# Patient Record
Sex: FEMALE | Race: OTHER | HISPANIC OR LATINO | URBAN - METROPOLITAN AREA
[De-identification: names, ages, dates, MRNs, and addresses within clinical notes are randomized per-mention and may not be internally consistent; named-entity substitution may affect disease eponyms.]

---

## 2021-06-12 ENCOUNTER — EMERGENCY (EMERGENCY)
Facility: HOSPITAL | Age: 21
LOS: 1 days | Discharge: HOME | End: 2021-06-12
Attending: EMERGENCY MEDICINE
Payer: MEDICAID

## 2021-06-12 VITALS
OXYGEN SATURATION: 99 % | TEMPERATURE: 99 F | HEART RATE: 89 BPM | HEIGHT: 65 IN | SYSTOLIC BLOOD PRESSURE: 137 MMHG | WEIGHT: 171.96 LBS | DIASTOLIC BLOOD PRESSURE: 84 MMHG | RESPIRATION RATE: 18 BRPM

## 2021-06-12 PROCEDURE — 99283 EMERGENCY DEPT VISIT LOW MDM: CPT

## 2021-06-13 VITALS
DIASTOLIC BLOOD PRESSURE: 78 MMHG | TEMPERATURE: 99 F | SYSTOLIC BLOOD PRESSURE: 131 MMHG | OXYGEN SATURATION: 99 % | HEART RATE: 82 BPM | RESPIRATION RATE: 16 BRPM

## 2021-06-13 PROCEDURE — 99283 EMERGENCY DEPT VISIT LOW MDM: CPT

## 2021-06-13 RX ORDER — IBUPROFEN 200 MG
400 TABLET ORAL ONCE
Refills: 0 | Status: COMPLETED | OUTPATIENT
Start: 2021-06-13 | End: 2021-06-13

## 2021-06-13 RX ORDER — ACETAMINOPHEN 500 MG
975 TABLET ORAL ONCE
Refills: 0 | Status: COMPLETED | OUTPATIENT
Start: 2021-06-13 | End: 2021-06-13

## 2021-06-13 RX ADMIN — Medication 300 MILLIGRAM(S): at 04:59

## 2021-06-13 RX ADMIN — Medication 400 MILLIGRAM(S): at 02:29

## 2021-06-13 RX ADMIN — Medication 975 MILLIGRAM(S): at 02:29

## 2021-06-13 NOTE — ED PROVIDER NOTE - NSFOLLOWUPINSTRUCTIONS_ED_ALL_ED_FT
You were seen and evaluated in the emergency room for dental pain.    Please follow up with your oral surgeon in the next few days. You can call the surgeon your dentist recommended or you can call the number attached.    Take the antibiotics 3 times a day.    Take 650mg tylenol every 6 hours as needed for pain.  Take 400-600 mg ibuprofen every 6-8 hours as needed for pain, make sure to take with food.  You can also use ice to the area for 10 minutes every 2 hours for the first 2 days after injury.    Seek immediate medical attention for any worsening, new, or concerning symptoms.    Please read all attached.

## 2021-06-13 NOTE — ED ADULT NURSE NOTE - OBJECTIVE STATEMENT
Pt is a AAox3, 21y female c/o right lower mouth and teeth pain x 1 day. Pt states she saw a dentist op and was prescribed atb and codeine. Pt states no pain relief with codeine and came to ED for further evaluation. No s/s drooling or tongue swelling. Denies cp, sob, palpitations or fevers.

## 2021-06-13 NOTE — ED PROVIDER NOTE - PROGRESS NOTE DETAILS
Nini Raman, resident MD: pt has significant improvement of pain after medications. will switch abx to clindamycin as pt has periorbital swelling since taking amoxicillin and concern for possible allergic reaction. will discharge patient home at this time. discussed return precautions and need for outpatient follow up.

## 2021-06-13 NOTE — ED PROVIDER NOTE - ATTENDING CONTRIBUTION TO CARE
attending Melyssa: 21yF no PMH p/w dental pain to R lower tooth since yesterday, seen by dentist started on amoxicillin and codeine for pain. Facial swelling (diffuse), no palpable gingival abscess on exam. Will provide additional pain control, change abx (possible reaction to amoxicillin?), reassess

## 2021-06-13 NOTE — ED PROVIDER NOTE - PATIENT PORTAL LINK FT
You can access the FollowMyHealth Patient Portal offered by [facility] by registering at the following website: [email or web address]. By joining [employer]’s FollowMyHealth portal, you will also be able to view your health information using other applications (apps) compatible with our system.

## 2021-06-13 NOTE — ED PROVIDER NOTE - CLINICAL SUMMARY MEDICAL DECISION MAKING FREE TEXT BOX
20yo woman presents with dental pain with no relief from outpatient medications and no evidence of abscess but was recommended extraction by dentist today and given a course of amoxicillin. No evidence of airway compromise or deep space infection. Will give pain medication and reassess and will likely require outpt f/u with oral surgeon.

## 2021-06-13 NOTE — ED PROVIDER NOTE - PHYSICAL EXAMINATION
General: well-appearing young woman in no acute distress  Head: normocephalic, atraumatic  Eyes: PERRL, no conjunctival injection  Mouth: moist mucous membranes, no oropharyngeal erythema, tender to palpation at tooth #28-29, no fluctuance at gumline, soft floor of mouth, midline uvula  Neck: supple neck, full ROM  CV: normal rate and rhythm, peripheral pulses 2+ bilateral UE  Respiratory: clear to auscultation bilaterally, no stridor  MSK: no joint deformities  Neuro: alert and oriented x3, speech clear, moving all extremities  Skin: no erythema on jaw or neck General: well-appearing young woman in no acute distress  Head: normocephalic, atraumatic  Eyes: PERRL, no conjunctival injection, periorbital swelling bilaterally  Mouth: moist mucous membranes, no oropharyngeal erythema, tender to palpation at tooth #28-29, no fluctuance at gumline, soft floor of mouth, midline uvula  Neck: supple neck, full ROM  CV: normal rate and rhythm, peripheral pulses 2+ bilateral UE  Respiratory: clear to auscultation bilaterally, no stridor  MSK: no joint deformities  Neuro: alert and oriented x3, speech clear, moving all extremities  Skin: no erythema on jaw or neck

## 2021-06-13 NOTE — ED PROVIDER NOTE - NS ED ROS FT
General: no fever  Head: no headache  Eyes: no vision change  ENT: +right lower jaw pain  CV: no chest pain  Resp: no SOB, no cough  GI: no N/V/D, no abdominal pain  : no dysuria  MSK: no joint pain  Skin: no new rash  Neuro: no focal weakness, no change in sensation

## 2021-06-13 NOTE — ED PROVIDER NOTE - OBJECTIVE STATEMENT
20yo woman no PMH presents with pain in right lower teeth with swelling since yesterday and was seen by a dentist today who started her on amoxicillin and give codeine for pain. She was also told to call an oral surgeon for an extraction but was not able to get in touch as they were closed. She has taken 3 tabs of codeine with persistent pain without relief and came to the ED for evaluation.  She is able to tolerate secretions and no SOB. No fevers. She has not tried any other pain medications.

## 2022-04-30 ENCOUNTER — EMERGENCY (EMERGENCY)
Facility: HOSPITAL | Age: 22
LOS: 1 days | Discharge: HOME | End: 2022-04-30
Attending: EMERGENCY MEDICINE | Admitting: EMERGENCY MEDICINE
Payer: MEDICAID

## 2022-04-30 VITALS
OXYGEN SATURATION: 98 % | TEMPERATURE: 98 F | HEART RATE: 94 BPM | SYSTOLIC BLOOD PRESSURE: 111 MMHG | RESPIRATION RATE: 16 BRPM | DIASTOLIC BLOOD PRESSURE: 64 MMHG

## 2022-04-30 VITALS
DIASTOLIC BLOOD PRESSURE: 78 MMHG | TEMPERATURE: 98 F | HEIGHT: 65 IN | SYSTOLIC BLOOD PRESSURE: 124 MMHG | OXYGEN SATURATION: 100 % | RESPIRATION RATE: 16 BRPM | HEART RATE: 85 BPM

## 2022-04-30 LAB
ALBUMIN SERPL ELPH-MCNC: 4.4 G/DL — SIGNIFICANT CHANGE UP (ref 3.3–5)
ALP SERPL-CCNC: 57 U/L — SIGNIFICANT CHANGE UP (ref 40–120)
ALT FLD-CCNC: 17 U/L — SIGNIFICANT CHANGE UP (ref 4–33)
ANION GAP SERPL CALC-SCNC: 13 MMOL/L — SIGNIFICANT CHANGE UP (ref 7–14)
AST SERPL-CCNC: 18 U/L — SIGNIFICANT CHANGE UP (ref 4–32)
BASOPHILS # BLD AUTO: 0.02 K/UL — SIGNIFICANT CHANGE UP (ref 0–0.2)
BASOPHILS NFR BLD AUTO: 0.4 % — SIGNIFICANT CHANGE UP (ref 0–2)
BILIRUB SERPL-MCNC: <0.2 MG/DL — SIGNIFICANT CHANGE UP (ref 0.2–1.2)
BUN SERPL-MCNC: 9 MG/DL — SIGNIFICANT CHANGE UP (ref 7–23)
CALCIUM SERPL-MCNC: 8.6 MG/DL — SIGNIFICANT CHANGE UP (ref 8.4–10.5)
CHLORIDE SERPL-SCNC: 97 MMOL/L — LOW (ref 98–107)
CO2 SERPL-SCNC: 23 MMOL/L — SIGNIFICANT CHANGE UP (ref 22–31)
CREAT SERPL-MCNC: 1.02 MG/DL — SIGNIFICANT CHANGE UP (ref 0.5–1.3)
EGFR: 80 ML/MIN/1.73M2 — SIGNIFICANT CHANGE UP
EOSINOPHIL # BLD AUTO: 0 K/UL — SIGNIFICANT CHANGE UP (ref 0–0.5)
EOSINOPHIL NFR BLD AUTO: 0 % — SIGNIFICANT CHANGE UP (ref 0–6)
FLUAV AG NPH QL: DETECTED
FLUBV AG NPH QL: SIGNIFICANT CHANGE UP
GLUCOSE SERPL-MCNC: 113 MG/DL — HIGH (ref 70–99)
HCT VFR BLD CALC: 36.3 % — SIGNIFICANT CHANGE UP (ref 34.5–45)
HGB BLD-MCNC: 12.2 G/DL — SIGNIFICANT CHANGE UP (ref 11.5–15.5)
IANC: 4.47 K/UL — SIGNIFICANT CHANGE UP (ref 1.8–7.4)
IMM GRANULOCYTES NFR BLD AUTO: 0.2 % — SIGNIFICANT CHANGE UP (ref 0–1.5)
LYMPHOCYTES # BLD AUTO: 0.44 K/UL — LOW (ref 1–3.3)
LYMPHOCYTES # BLD AUTO: 7.9 % — LOW (ref 13–44)
MCHC RBC-ENTMCNC: 30.5 PG — SIGNIFICANT CHANGE UP (ref 27–34)
MCHC RBC-ENTMCNC: 33.6 GM/DL — SIGNIFICANT CHANGE UP (ref 32–36)
MCV RBC AUTO: 90.8 FL — SIGNIFICANT CHANGE UP (ref 80–100)
MONOCYTES # BLD AUTO: 0.64 K/UL — SIGNIFICANT CHANGE UP (ref 0–0.9)
MONOCYTES NFR BLD AUTO: 11.5 % — SIGNIFICANT CHANGE UP (ref 2–14)
NEUTROPHILS # BLD AUTO: 4.47 K/UL — SIGNIFICANT CHANGE UP (ref 1.8–7.4)
NEUTROPHILS NFR BLD AUTO: 80 % — HIGH (ref 43–77)
NRBC # BLD: 0 /100 WBCS — SIGNIFICANT CHANGE UP
NRBC # FLD: 0 K/UL — SIGNIFICANT CHANGE UP
PLATELET # BLD AUTO: 180 K/UL — SIGNIFICANT CHANGE UP (ref 150–400)
POTASSIUM SERPL-MCNC: 3.9 MMOL/L — SIGNIFICANT CHANGE UP (ref 3.5–5.3)
POTASSIUM SERPL-SCNC: 3.9 MMOL/L — SIGNIFICANT CHANGE UP (ref 3.5–5.3)
PROT SERPL-MCNC: 7.8 G/DL — SIGNIFICANT CHANGE UP (ref 6–8.3)
RBC # BLD: 4 M/UL — SIGNIFICANT CHANGE UP (ref 3.8–5.2)
RBC # FLD: 13.3 % — SIGNIFICANT CHANGE UP (ref 10.3–14.5)
RSV RNA NPH QL NAA+NON-PROBE: SIGNIFICANT CHANGE UP
SARS-COV-2 RNA SPEC QL NAA+PROBE: SIGNIFICANT CHANGE UP
SODIUM SERPL-SCNC: 133 MMOL/L — LOW (ref 135–145)
WBC # BLD: 5.58 K/UL — SIGNIFICANT CHANGE UP (ref 3.8–10.5)
WBC # FLD AUTO: 5.58 K/UL — SIGNIFICANT CHANGE UP (ref 3.8–10.5)

## 2022-04-30 PROCEDURE — 93010 ELECTROCARDIOGRAM REPORT: CPT

## 2022-04-30 PROCEDURE — 99284 EMERGENCY DEPT VISIT MOD MDM: CPT | Mod: 25

## 2022-04-30 PROCEDURE — 71046 X-RAY EXAM CHEST 2 VIEWS: CPT | Mod: 26

## 2022-04-30 RX ORDER — ACETAMINOPHEN 500 MG
650 TABLET ORAL ONCE
Refills: 0 | Status: COMPLETED | OUTPATIENT
Start: 2022-04-30 | End: 2022-04-30

## 2022-04-30 RX ORDER — SODIUM CHLORIDE 9 MG/ML
1000 INJECTION INTRAMUSCULAR; INTRAVENOUS; SUBCUTANEOUS ONCE
Refills: 0 | Status: COMPLETED | OUTPATIENT
Start: 2022-04-30 | End: 2022-04-30

## 2022-04-30 RX ADMIN — SODIUM CHLORIDE 1000 MILLILITER(S): 9 INJECTION INTRAMUSCULAR; INTRAVENOUS; SUBCUTANEOUS at 17:26

## 2022-04-30 RX ADMIN — Medication 650 MILLIGRAM(S): at 17:26

## 2022-04-30 NOTE — ED PROVIDER NOTE - PROGRESS NOTE DETAILS
ANGÉLICA Gordon: Labs reviewed, pt received 1L NS, reports improvement in symptoms. Will d/c home with PMD follow up. Likely viral illness, flu/covid test pending

## 2022-04-30 NOTE — ED PROVIDER NOTE - ATTENDING APP SHARED VISIT CONTRIBUTION OF CARE
Attending Statement: I have reviewed and agree with all pertinent clinical information, including history and physical exam and agree with treatment plan of the PA, except as noted.  21yoF denies any sig pmhx presents three day hx of body ache, myalgia, cough. Dry cough, has some chest pain with cough. no SOB no nausea or vomit. no diarrhea. Dec po intake. no abdominal pain. no sick contact no travel. Vital signs noted. well appearing no distress. non icteric No resp distress. able to speak in full and clear sentences. no wheeze, rales or stridor. soft nontender abdomen. no  rebound. no guarding. no sign of trauma. no CVAT plan lab, cxr, covid, IVF< re asses

## 2022-04-30 NOTE — ED PROVIDER NOTE - PATIENT PORTAL LINK FT
You can access the FollowMyHealth Patient Portal offered by [facility] by registering at the following website: [email or web address]. By joining [employer]’s FollowMyHealth portal, you will also be able to view your health information using other applications (apps) compatible with our system.

## 2022-04-30 NOTE — ED PROVIDER NOTE - CLINICAL SUMMARY MEDICAL DECISION MAKING FREE TEXT BOX
21yF w/no stated pmhx presenting with 3 days of fever, body aches, cough, chest pain, lightheadedness. Pt works as a , has had 2 negative home covid tests. Likely viral syndrome. Pt is well appearing, afebrile, EKG sinus tach 110bpm, lungs clear on exam. Plan: flu/covid test, basic labs, IV fluids, tylenol, CXR.

## 2022-04-30 NOTE — ED PROVIDER NOTE - OBJECTIVE STATEMENT
21yF w/no stated pmhx presenting with 3 days of fever, body aches, cough and chest pain. Pt states 3 days ago her symptoms began with headache and dry cough. Reports intermittent fever, tmax 101, with body aches. Reports sternal chest pain, lasting a few minutes at a time, non exertional, non radiating, no chest pain at present. Pt states she had nausea today and vomited while waiting in the ED. +decreased appetite, sore throat and dry cough. Additionally she reports lightheadedness and generalized weakness. Pt works as a , has had 2 negative home covid tests. Pt denies shortness of breath, palpitations, near syncope, abd pain, diarrhea, room spinning dizziness, urinary complaints, recent travel, known sick contacts or any other concerns. 21yF w/no stated pmhx presenting with 3 days of fever, body aches, cough and chest pain. Pt states 3 days ago her symptoms began with headache and dry cough. Reports intermittent fever, tmax 101, with body aches. Reports intermittent mid sternal chest pain lasting a few minutes at a time, non exertional, non radiating, mostly occurring with cough, no chest pain at present. Pt states she had nausea today and vomited while waiting in the ED. +decreased appetite, sore throat and dry cough. Additionally she reports lightheadedness and generalized weakness. Pt works as a , has had 2 negative home covid tests. Pt denies shortness of breath, palpitations, near syncope, abd pain, diarrhea, room spinning dizziness, urinary complaints, recent travel, known sick contacts or any other concerns.

## 2022-04-30 NOTE — ED PROVIDER NOTE - NS ED ATTENDING STATEMENT MOD
This was a shared visit with the BEBA. I reviewed and verified the documentation and independently performed the documented:

## 2022-04-30 NOTE — ED ADULT TRIAGE NOTE - CHIEF COMPLAINT QUOTE
Pt c/o fever, weakness, n/v, dizziness, chest pain, intermittent since Thursday. Pt denies SOB, afebrile at present.

## 2022-04-30 NOTE — ED PROVIDER NOTE - NSFOLLOWUPINSTRUCTIONS_ED_ALL_ED_FT
Follow up with your primary care doctor within 1 week  Drink plenty of fluids  Take Tylenol 650mg every 6 hours as needed for fever or pain  -You can also take Ibuprofen 600mg every 6-8 hours as needed for fever or pain, take with food  Return to the ER with any worsening or concerning symptoms, weakness, shortness of breath, chest pain, feeling faint or any other concerns.

## 2022-04-30 NOTE — ED ADULT NURSE NOTE - OBJECTIVE STATEMENT
Pt received c/o fever and muscle aches since Thursday. Pt also c/o generalized weakness and decreased PO intake due to no appetite.

## 2023-06-28 ENCOUNTER — OUTPATIENT (OUTPATIENT)
Dept: OUTPATIENT SERVICES | Facility: HOSPITAL | Age: 23
LOS: 1 days | End: 2023-06-28
Payer: MEDICAID

## 2023-06-28 VITALS
SYSTOLIC BLOOD PRESSURE: 130 MMHG | RESPIRATION RATE: 16 BRPM | OXYGEN SATURATION: 100 % | WEIGHT: 154.98 LBS | DIASTOLIC BLOOD PRESSURE: 69 MMHG | HEIGHT: 66 IN | HEART RATE: 87 BPM | TEMPERATURE: 98 F

## 2023-06-28 DIAGNOSIS — N84.0 POLYP OF CORPUS UTERI: ICD-10-CM

## 2023-06-28 DIAGNOSIS — R01.1 CARDIAC MURMUR, UNSPECIFIED: ICD-10-CM

## 2023-06-28 DIAGNOSIS — H40.9 UNSPECIFIED GLAUCOMA: ICD-10-CM

## 2023-06-28 DIAGNOSIS — Z01.818 ENCOUNTER FOR OTHER PREPROCEDURAL EXAMINATION: ICD-10-CM

## 2023-06-28 LAB — BLD GP AB SCN SERPL QL: SIGNIFICANT CHANGE UP

## 2023-06-28 RX ORDER — SODIUM CHLORIDE 9 MG/ML
3 INJECTION INTRAMUSCULAR; INTRAVENOUS; SUBCUTANEOUS EVERY 8 HOURS
Refills: 0 | Status: DISCONTINUED | OUTPATIENT
Start: 2023-07-05 | End: 2023-07-19

## 2023-06-28 NOTE — H&P PST ADULT - NEGATIVE ENMT SYMPTOMS
no hearing difficulty/no ear pain no hearing difficulty/no ear pain/no tinnitus/no vertigo/no sinus symptoms/no nasal congestion/no nasal discharge/no nasal obstruction/no post-nasal discharge/no nose bleeds/no recurrent cold sores

## 2023-06-28 NOTE — H&P PST ADULT - PROBLEM SELECTOR PLAN 1
BIRGIT stop bang score 0. Low risk for BIRGIT. .  Preoperative instructions discussed with pt and given to pt. Instructed pt that she will need someone to escort her home after surgery, to notify security when she arrives in the lobby of the hospital that she is here for surgery, not to eat or drink anything after midnight the night before the surgery, to avoid NSAIDs such as Ibuprofen, Motrin, Aleve, Advil, Naproxen before surgery, to take Tylenol if needed for pain,  Instructed about use of Chlorhexidine 4% soap for 3 days before surgery including the morning of the surgery to prevent infection. Verbalized understanding of instructions given.

## 2023-06-28 NOTE — H&P PST ADULT - ASSESSMENT
89 Patient is scheduled for dilations and curettage with hysteroscopy on 7/5/2023. Patient is STOP BANG 0 and low risk for BIRGIT, optimized by medical doctor and under care by cardiology ( echo , stress test done ) .

## 2023-06-28 NOTE — H&P PST ADULT - HISTORY OF PRESENT ILLNESS
23 years old female pmh of glaucoma and heart murmur, under care of cardiology ( had echo and stress test as pre op evaluation), c/o of cramps during periods , pt was dx with polyp of corpus uteri and is scheduled for dilations and curettage with hysteroscopy on 7/5/2023.

## 2023-06-28 NOTE — H&P PST ADULT - NSANTHOSAYNRD_GEN_A_CORE
No. BIRGIT screening performed.  STOP BANG Legend: 0-2 = LOW Risk; 3-4 = INTERMEDIATE Risk; 5-8 = HIGH Risk

## 2023-06-28 NOTE — H&P PST ADULT - PROBLEM SELECTOR PLAN 2
Patient is under care of Cardiologist , DR. Gonzalez [phone] , echo, stress test done, as per Cardio, pt is optimized for sx, pt denies chest pain SOB on exertion or palpitations

## 2023-06-29 PROCEDURE — G0463: CPT

## 2023-07-05 ENCOUNTER — OUTPATIENT (OUTPATIENT)
Dept: OUTPATIENT SERVICES | Facility: HOSPITAL | Age: 23
LOS: 1 days | End: 2023-07-05
Payer: MEDICAID

## 2023-07-05 VITALS
RESPIRATION RATE: 16 BRPM | TEMPERATURE: 99 F | HEART RATE: 89 BPM | OXYGEN SATURATION: 100 % | SYSTOLIC BLOOD PRESSURE: 107 MMHG | WEIGHT: 154.98 LBS | DIASTOLIC BLOOD PRESSURE: 74 MMHG | HEIGHT: 66 IN

## 2023-07-05 VITALS
TEMPERATURE: 99 F | OXYGEN SATURATION: 99 % | DIASTOLIC BLOOD PRESSURE: 56 MMHG | SYSTOLIC BLOOD PRESSURE: 114 MMHG | HEART RATE: 77 BPM | RESPIRATION RATE: 16 BRPM

## 2023-07-05 DIAGNOSIS — N84.0 POLYP OF CORPUS UTERI: ICD-10-CM

## 2023-07-05 DIAGNOSIS — Z01.818 ENCOUNTER FOR OTHER PREPROCEDURAL EXAMINATION: ICD-10-CM

## 2023-07-05 LAB
BLD GP AB SCN SERPL QL: SIGNIFICANT CHANGE UP
HCG UR QL: NEGATIVE — SIGNIFICANT CHANGE UP

## 2023-07-05 PROCEDURE — 36415 COLL VENOUS BLD VENIPUNCTURE: CPT

## 2023-07-05 PROCEDURE — 88305 TISSUE EXAM BY PATHOLOGIST: CPT | Mod: 26

## 2023-07-05 PROCEDURE — 58558 HYSTEROSCOPY BIOPSY: CPT

## 2023-07-05 PROCEDURE — 86850 RBC ANTIBODY SCREEN: CPT

## 2023-07-05 PROCEDURE — 86901 BLOOD TYPING SEROLOGIC RH(D): CPT

## 2023-07-05 PROCEDURE — 88305 TISSUE EXAM BY PATHOLOGIST: CPT

## 2023-07-05 PROCEDURE — 86900 BLOOD TYPING SEROLOGIC ABO: CPT

## 2023-07-05 PROCEDURE — 81025 URINE PREGNANCY TEST: CPT

## 2023-07-05 RX ORDER — FENTANYL CITRATE 50 UG/ML
50 INJECTION INTRAVENOUS ONCE
Refills: 0 | Status: DISCONTINUED | OUTPATIENT
Start: 2023-07-05 | End: 2023-07-05

## 2023-07-05 RX ORDER — FENTANYL CITRATE 50 UG/ML
25 INJECTION INTRAVENOUS
Refills: 0 | Status: DISCONTINUED | OUTPATIENT
Start: 2023-07-05 | End: 2023-07-05

## 2023-07-05 RX ADMIN — SODIUM CHLORIDE 3 MILLILITER(S): 9 INJECTION INTRAMUSCULAR; INTRAVENOUS; SUBCUTANEOUS at 08:43

## 2023-07-05 NOTE — ASU DISCHARGE PLAN (ADULT/PEDIATRIC) - NS MD DC FALL RISK RISK
For information on Fall & Injury Prevention, visit: [email or web address] OR  [email or web address] OR  https://www.cdc.gov/steadi/patient.html

## 2023-07-05 NOTE — ASU PATIENT PROFILE, ADULT - PATIENT'S PREFERRED PRONOUN
Discussed with pt that he has normal thyroid level. He was able to make an appt with a psychiatrist on Nov 12th. Started Lexapro 10mg daily and able to sleep better. He will return to clinic in 4-6 weeks and possibly make adjustments to medication dose if needed. Pt reports understanding.       Electronically signed by:DEB SANTACRUZ D.O.  Oct 19 2018  1:55PM CST    
Her/She

## 2023-07-05 NOTE — ASU DISCHARGE PLAN (ADULT/PEDIATRIC) - CARE PROVIDER_API CALL
Franco Rivers  Obstetrics and Gynecology  [address] [place] [address]  Phone: [phone]  Fax: [phone]  Established Patient  Follow Up Time: 2 weeks

## 2023-07-07 LAB — SURGICAL PATHOLOGY STUDY: SIGNIFICANT CHANGE UP

## 2023-11-29 NOTE — H&P PST ADULT - CONSTITUTIONAL
Last visit with Margaux Hardy, APRN: 11/14/2023  Last visit in [facility] INFECTIOUS DISEASE: 11/14/2023    Patient's next visit in [facility] INFECTIOUS DISEASE: 2/20/2024     LL 07/18/2023    Please advise on medication refill  
normal/well-groomed/no distress

## 2025-06-30 NOTE — ED ADULT NURSE NOTE - RADIATION
Discharge instructions printed and provided to patient with all questions answered in full. PIV x1 removed with cath tip intact. Pt VSS and no signs of distress noted. Pt able to ambulate to restroom and urinate with slight blood-tinged urine. Pt tolerating liquids and solids with no issues. Pt ambulating within room with no issues. Pt condition stable. Waiting for patient's rideTete for transport home.     
Patient states okay to review discharge information with patient's friend, Tete Floyd  
Spiritual Health History and Assessment/Progress Note  [facility]    Pre-Procedural,  , Life Adjustments, Adjustment to illness,      Name: Allegra Bettencourt MRN: [id number]    Age: 69 y.o.     Sex: female   Language: English   Spiritism: Non-Congregational   Urethral polyp     Date: 6/30/2025            Total Time Calculated: 20 min              Spiritual Assessment began in [facility]        Referral/Consult From: Nurse   Encounter Overview/Reason: Pre-Procedural  Service Provided For: Patient    Janey, Belief, Meaning:   Patient identifies as spiritual, is connected with a janey tradition or spiritual practice, and has beliefs or practices that help with coping during difficult times  Family/Friends No family/friends present      Importance and Influence:  Patient has spiritual/personal beliefs that influence decisions regarding their health  Family/Friends No family/friends present    Community:  Patient is connected with a spiritual community and feels well-supported. Support system includes: [place] [facility], Friends, and Extended family  Family/Friends No family/friends present    Assessment and Plan of Care:   This  responded to Nurses' page to visit this Patient pre-op. Patient shared life/review/legacy including family, work, being of Sikhism janey, reason for being admitted to the hospital and need for prayer. Patient shared emotions and feelings regarding work and need for decisions to impact health and well-being. Listened attentively providing time and space for reflection and sharing of life's sacred events. Prayed fervent prayer including laying on of hands. Maintained sustaining ministry of presence. Patient smiled intermittently and thanked  for visit.  available upon request.  Patient Interventions include: Facilitated expression of thoughts and feelings, Explored spiritual coping/struggle/distress, Engaged in theological reflection, Affirmed coping 
no radiation

## (undated) DEVICE — WARMING BLANKET UPPER ADULT

## (undated) DEVICE — TUBING TUR 2 PRONG

## (undated) DEVICE — DRAPE LIGHT HANDLE COVER (BLUE)

## (undated) DEVICE — ELCTR CUTTING LOOP MONOPOLAR ANGLED 24F

## (undated) DEVICE — GOWN XL

## (undated) DEVICE — SOL IRR SORBITOL 3% 3000ML

## (undated) DEVICE — FOR-ESU VALLEYLAB T7E14999DX: Type: DURABLE MEDICAL EQUIPMENT

## (undated) DEVICE — LAP PAD W RING 18 X 18"

## (undated) DEVICE — SOL IRR POUR NS 0.9% 1500ML

## (undated) DEVICE — VENODYNE/SCD SLEEVE CALF MEDIUM

## (undated) DEVICE — BASIN SET SINGLE

## (undated) DEVICE — SOL IRR GLYCINE 1.5% 3000L

## (undated) DEVICE — DRAPE LEGGINGS 6" CUFF 28X43"

## (undated) DEVICE — TUBING SET TUR BLADDER IRRIGATION Y-TYPE 81"